# Patient Record
Sex: FEMALE
[De-identification: names, ages, dates, MRNs, and addresses within clinical notes are randomized per-mention and may not be internally consistent; named-entity substitution may affect disease eponyms.]

---

## 2021-02-13 ENCOUNTER — NURSE TRIAGE (OUTPATIENT)
Dept: OTHER | Facility: CLINIC | Age: 46
End: 2021-02-13

## 2021-02-13 NOTE — TELEPHONE ENCOUNTER
Brief description of triage: Pain in upper abd and in chest (upper back)     Triage indicates for patient to ED    Care advice provided, patient verbalizes understanding; denies any other questions or concerns; instructed to call back for any new or worsening symptoms. Reason for Disposition   Chest pain   [1] Chest pain (or \"angina\") comes and goes AND [2] is happening more often (increasing in frequency) or getting worse (increasing in severity)    Answer Assessment - Initial Assessment Questions  1. LOCATION: \"Where does it hurt? \"      Right under rib cage and and into back         2. RADIATION: \"Does the pain shoot anywhere else? \" (e.g., chest, back)    Upper back     3. ONSET: \"When did the pain begin? \" (e.g., minutes, hours or days ago)    0500 today     4. SUDDEN: \"Gradual or sudden onset? \"   Woke me up this morning     5. PATTERN \"Does the pain come and go, or is it constant? \"     - If constant: \"Is it getting better, staying the same, or worsening? \"       (Note: Constant means the pain never goes away completely; most serious pain is constant and it progresses)      - If intermittent: \"How long does it last?\" \"Do you have pain now? \"      (Note: Intermittent means the pain goes away completely between bouts)   Intermittent     6. SEVERITY: \"How bad is the pain? \"  (e.g., Scale 1-10; mild, moderate, or severe)    - MILD (1-3): doesn't interfere with normal activities, abdomen soft and not tender to touch     - MODERATE (4-7): interferes with normal activities or awakens from sleep, tender to touch     - SEVERE (8-10): excruciating pain, doubled over, unable to do any normal activities    Happens for about 10 seconds 04/10     7. RECURRENT SYMPTOM: \"Have you ever had this type of abdominal pain before? \" If so, ask: \"When was the last time? \" and \"What happened that time? \"    Abd pain in past during a virus      8. CAUSE: \"What do you think is causing the abdominal pain? \"   Unsure    9.  RELIEVING/AGGRAVATING FACTORS: \"What makes it better or worse? \" (e.g., movement, antacids, bowel movement)     Walking around helps     10. OTHER SYMPTOMS: \"Has there been any vomiting, diarrhea, constipation, or urine problems? \"    Bloating    11. PREGNANCY: \"Is there any chance you are pregnant? \" \"When was your last menstrual period? \"     No    Protocols used: ABDOMINAL PAIN - FEMALE-ADULT-AH, CHEST PAIN-ADULT-AH      This triage is a result of a call to [address] [place] [address]. Please do not respond to the triage nurse through this encounter. Any subsequent communication should be directly with the patient.

## 2023-09-30 PROBLEM — N92.0 MENORRHAGIA: Status: ACTIVE | Noted: 2023-09-30

## 2023-09-30 PROBLEM — F32.A ANXIETY AND DEPRESSION: Status: ACTIVE | Noted: 2023-09-30

## 2023-09-30 PROBLEM — F41.9 ANXIETY AND DEPRESSION: Status: ACTIVE | Noted: 2023-09-30

## 2023-09-30 PROBLEM — J02.9 SORE THROAT: Status: RESOLVED | Noted: 2023-09-30 | Resolved: 2023-09-30

## 2023-09-30 RX ORDER — NORETHINDRONE ACETATE AND ETHINYL ESTRADIOL .03; 1.5 MG/1; MG/1
1 TABLET ORAL DAILY
COMMUNITY
Start: 2021-08-06 | End: 2023-10-03 | Stop reason: CLARIF

## 2023-09-30 RX ORDER — SERTRALINE HYDROCHLORIDE 50 MG/1
1 TABLET, FILM COATED ORAL DAILY
COMMUNITY
Start: 2019-09-09

## 2023-10-03 ENCOUNTER — OFFICE VISIT (OUTPATIENT)
Dept: OBSTETRICS AND GYNECOLOGY | Facility: CLINIC | Age: 48
End: 2023-10-03
Payer: COMMERCIAL

## 2023-10-03 VITALS
WEIGHT: 155.8 LBS | HEIGHT: 65 IN | BODY MASS INDEX: 25.96 KG/M2 | DIASTOLIC BLOOD PRESSURE: 84 MMHG | SYSTOLIC BLOOD PRESSURE: 130 MMHG

## 2023-10-03 DIAGNOSIS — Z30.41 ENCOUNTER FOR SURVEILLANCE OF CONTRACEPTIVE PILLS: ICD-10-CM

## 2023-10-03 DIAGNOSIS — Z12.31 ENCOUNTER FOR SCREENING MAMMOGRAM FOR MALIGNANT NEOPLASM OF BREAST: Primary | ICD-10-CM

## 2023-10-03 PROCEDURE — 99396 PREV VISIT EST AGE 40-64: CPT | Performed by: ADVANCED PRACTICE MIDWIFE

## 2023-10-03 RX ORDER — NORETHINDRONE 0.35 MG/1
1 TABLET ORAL DAILY
Qty: 28 TABLET | Refills: 11 | Status: SHIPPED | OUTPATIENT
Start: 2023-10-03 | End: 2023-10-30

## 2023-10-03 NOTE — PROGRESS NOTES
GYNECOLOGIC PROGRESS NOTE    CC:  see below. Patient denies abnormal pap Hx. Due for next pap in 2026. Denies need for STI cultures. Patient using OCPs due to bleeding. Patient has noticed her menses are heavier on the first day and then lightens up. After her last menses she had spotting for another week. Patient states she has a hx of fibroids. Patient is a 47 year old, non smoker. No breast concerns. Due for mammogram, will order. D/O risks of age with OCP use verses no methods and or a progestin only method.  Chief Complaint   Patient presents with    Annual Exam     Est pt for annual.   Pap 8/27/21 neg hpv neg  Mamm 10/11/22 wnl     Woke up one day with bright red blood. Is on birth control. And had spotting afterwards.         HPI:  Jasmine Pardo is here for a routine GYN examination. Last menses very heavy. Spotting after menses x 1 week.      Depression screen:  negative  Fall screen:  negative  DV screen:  negative      ROS:  GEN - no fevers or chills  RESP - no SOB or cough  GI - no blood in BMs  URO - no hematuria  GYN - vaginal discharge, spotting after menses  PSYCH - mood OK      Past Medical History:   Diagnosis Date    Sore throat 09/30/2023     No past surgical history on file.  Social History     Socioeconomic History    Marital status:      Spouse name: Not on file    Number of children: Not on file    Years of education: Not on file    Highest education level: Not on file   Occupational History    Not on file   Tobacco Use    Smoking status: Never    Smokeless tobacco: Never   Substance and Sexual Activity    Alcohol use: Not on file    Drug use: Not on file    Sexual activity: Not on file   Other Topics Concern    Not on file   Social History Narrative    Not on file     Social Determinants of Health     Financial Resource Strain: Not on file   Food Insecurity: Not on file   Transportation Needs: Not on file   Physical Activity: Not on file   Stress: Not on file   Social Connections: Not on  "file   Intimate Partner Violence: Not on file   Housing Stability: Not on file     Cancer-related family history is not on file.       PHYSICAL EXAM:  /84   Ht 1.651 m (5' 5\")   Wt 70.7 kg (155 lb 12.8 oz)   BMI 25.93 kg/m²   GEN:  A&O, NAD  HEENT  head NC/AT, conjunctiva clear, no visible goiter  URO:  normal urethra, no bladder TTP  GYN:  normal vulva and perineum w/o lesions or ulcers, normal vagina without discharge or lesions, normal cervix without lesions or discharge or CMT, uterus NT/NE, adnexa mobile and NT/NE  BREAST:  no masses or TTP, no skin lesions or nipple discharge  PSYCH:  normal affect, non-anxious      IMPRESSION/PLAN:  Problem List Items Addressed This Visit    None  Visit Diagnoses       Encounter for screening mammogram for malignant neoplasm of breast    -  Primary    Relevant Orders    BI mammo bilateral screening tomosynthesis         A: normal gyn exam. Low risk for progestin only pills/method.   Plan: 1. Pap due 2026. 2. Mammogram order placed. 3. Return 1 year or prn.  4. Finish current pill pack and then change to progestin only pill. Will try Slynd and if the insurance won't cover the Rx then will send another progestin only pill. 5. If bleeding not managed by progestin only pills then consider TXA, sonogram with referral for hysteroscopic to consider options like an ablation/hysterectomy/IUD.     Pap and HPV normal in 2021 no Hx of HGSIL, next due in 2026.   [facility] pap smear screening guidelines reviewed with the patient.     mammogram ordered for this year last one done 10/11/22           JUS Silverman    "

## 2023-10-04 RX ORDER — DROSPIRENONE AND ETHINYL ESTRADIOL 0.03MG-3MG
1 KIT ORAL DAILY
Qty: 28 TABLET | Refills: 11 | OUTPATIENT
Start: 2023-10-04

## 2023-10-11 ENCOUNTER — HOSPITAL ENCOUNTER (OUTPATIENT)
Dept: RADIOLOGY | Facility: HOSPITAL | Age: 48
Discharge: HOME | End: 2023-10-11
Payer: COMMERCIAL

## 2023-10-11 VITALS — WEIGHT: 155 LBS | HEIGHT: 65 IN | BODY MASS INDEX: 25.83 KG/M2

## 2023-10-11 DIAGNOSIS — Z12.31 ENCOUNTER FOR SCREENING MAMMOGRAM FOR MALIGNANT NEOPLASM OF BREAST: ICD-10-CM

## 2023-10-11 PROCEDURE — 77063 BREAST TOMOSYNTHESIS BI: CPT | Mod: BILATERAL PROCEDURE | Performed by: RADIOLOGY

## 2023-10-11 PROCEDURE — 77067 SCR MAMMO BI INCL CAD: CPT | Mod: BILATERAL PROCEDURE | Performed by: RADIOLOGY

## 2023-10-11 PROCEDURE — 77063 BREAST TOMOSYNTHESIS BI: CPT

## 2023-10-28 DIAGNOSIS — Z30.41 ENCOUNTER FOR SURVEILLANCE OF CONTRACEPTIVE PILLS: ICD-10-CM

## 2023-10-30 RX ORDER — NORETHINDRONE 0.35 MG/1
1 TABLET ORAL DAILY
Qty: 84 TABLET | Refills: 4 | Status: SHIPPED | OUTPATIENT
Start: 2023-10-30

## 2024-10-08 ENCOUNTER — APPOINTMENT (OUTPATIENT)
Dept: OBSTETRICS AND GYNECOLOGY | Facility: CLINIC | Age: 49
End: 2024-10-08
Payer: COMMERCIAL

## 2024-10-08 VITALS
HEIGHT: 65 IN | SYSTOLIC BLOOD PRESSURE: 140 MMHG | WEIGHT: 161.8 LBS | BODY MASS INDEX: 26.96 KG/M2 | DIASTOLIC BLOOD PRESSURE: 76 MMHG

## 2024-10-08 DIAGNOSIS — Z01.419 ENCOUNTER FOR ANNUAL ROUTINE GYNECOLOGICAL EXAMINATION: Primary | ICD-10-CM

## 2024-10-08 DIAGNOSIS — Z12.31 ENCOUNTER FOR SCREENING MAMMOGRAM FOR MALIGNANT NEOPLASM OF BREAST: ICD-10-CM

## 2024-10-08 PROCEDURE — 99396 PREV VISIT EST AGE 40-64: CPT | Performed by: ADVANCED PRACTICE MIDWIFE

## 2024-10-08 PROCEDURE — 3008F BODY MASS INDEX DOCD: CPT | Performed by: ADVANCED PRACTICE MIDWIFE

## 2024-10-08 ASSESSMENT — PATIENT HEALTH QUESTIONNAIRE - PHQ9
SUM OF ALL RESPONSES TO PHQ9 QUESTIONS 1 AND 2: 0
1. LITTLE INTEREST OR PLEASURE IN DOING THINGS: NOT AT ALL
2. FEELING DOWN, DEPRESSED OR HOPELESS: NOT AT ALL

## 2024-10-08 ASSESSMENT — ENCOUNTER SYMPTOMS
DEPRESSION: 0
LOSS OF SENSATION IN FEET: 0
OCCASIONAL FEELINGS OF UNSTEADINESS: 0

## 2024-10-08 NOTE — PROGRESS NOTES
"GYNECOLOGY PROGRESS NOTE        CC:  annual exam   Chief Complaint   Patient presents with    Annual Exam     Jasmine Pardo is here today for annual exam and is an established patient.   Complaints: Did stop her birth control  LMP: 9/25/24  LAST PAP: 8/2021 neg hpv neg  ABN PAP HX: once in 1999  LAST MAMMOGRAM: 10/12/23 cat 1  ABN MAMMOGRAM HX: no  SEXUAL ACTIVITY: yes, denies pain with intercourse   URINARY: no          HPI:  48 y.o female presents for annual exam. Denies GYN concerns. Hx. Of fibroids. Pt stopped taking progesterone pills for heavy menstrual bleeding in 4/24 d/t still having a regular period. 1-2 days of heavy bleeding then tapers down. Pt reported weight gain, moodiness, and breast pain. She stopped taking it 4/24.       ROS:  GYN - see HPI        PHYSICAL EXAM:  /76 (BP Location: Left arm, Patient Position: Sitting, BP Cuff Size: Adult)   Ht 1.651 m (5' 5\")   Wt 73.4 kg (161 lb 12.8 oz)   LMP 09/25/2024   BMI 26.92 kg/m²   GEN:  A&O, NAD  GYN:  normal vulva and perineum w/o lesions or ulcers, vagina without discharge   Breast: normal exam  PSYCH:  normal affect, non-anxious      IMPRESSION/PLAN:  A: normal GYN exam. Regular menses with first 1-2 days heavy flow then tapers down. Monthly.   P: Mammogram ordered. PAP due in 2026.   Problem List Items Addressed This Visit    None  Visit Diagnoses       Encounter for screening mammogram for malignant neoplasm of breast    -  Primary    Relevant Orders    BI mammo bilateral screening tomosynthesis    Encounter for annual routine gynecological examination              Complete mammogram. Follow-up in 1 year or sooner if needed.     Elba Sapp RN    I saw and evaluated the patient. I personally obtained the key and critical portions of the history and physical exam or was physically present for key and critical portions performed by the student. I reviewed the student's documentation and discussed the patient with the student. I agree " with the student's medical decision making as documented in the note.    Bhavana Emmanuel CNM

## 2024-10-11 ENCOUNTER — HOSPITAL ENCOUNTER (OUTPATIENT)
Dept: RADIOLOGY | Facility: HOSPITAL | Age: 49
Discharge: HOME | End: 2024-10-11
Payer: COMMERCIAL

## 2024-10-11 VITALS — WEIGHT: 161.8 LBS | BODY MASS INDEX: 26.96 KG/M2 | HEIGHT: 65 IN

## 2024-10-11 DIAGNOSIS — Z12.31 ENCOUNTER FOR SCREENING MAMMOGRAM FOR MALIGNANT NEOPLASM OF BREAST: ICD-10-CM

## 2024-10-11 PROCEDURE — 77067 SCR MAMMO BI INCL CAD: CPT

## 2025-01-31 NOTE — TELEPHONE ENCOUNTER
Refill Decision Note   Chelsie Foster  is requesting a refill authorization.  Brief Assessment and Rationale for Refill:  Approve     Medication Therapy Plan:        Comments:     Note composed:12:11 PM 01/31/2025            Medications was changed yesterday to correct Rx

## 2025-10-14 ENCOUNTER — APPOINTMENT (OUTPATIENT)
Dept: OBSTETRICS AND GYNECOLOGY | Facility: CLINIC | Age: 50
End: 2025-10-14
Payer: COMMERCIAL